# Patient Record
(demographics unavailable — no encounter records)

---

## 2024-10-10 NOTE — PHYSICAL EXAM
[de-identified] : Physical exam of her left thumb: Negative swelling or ecchymosis.  Nontender over the MCP or IP joint.  No laxity to collateral ligaments.  She can flex and extend at the MCP and IP joint.  Her sensory and motor are intact.

## 2024-10-10 NOTE — DATA REVIEWED
[FreeTextEntry1] : MRI of her left thumb obtained on 10/2024 show a focal full-thickness volar plate tear without any injury to the collateral ligaments.

## 2024-10-10 NOTE — HISTORY OF PRESENT ILLNESS
[de-identified] : Patient is a 14-year-old female here for evaluation of her left thumb.  She is about 2 and half weeks status post her injury.  She is feeling much better.

## 2024-10-10 NOTE — DISCUSSION/SUMMARY
[de-identified] : Patient is about 2 and half weeks status post the injury.  She understands his injuries take about 4 to 6 weeks to heal.  The first 2 to 3 weeks are typically the worst.  She will start to wean herself out of the thumb spica brace.  I recommend she refrain from contact sports for another 2 weeks.  She understands random residual pain and swelling, or for months.  Follow-up as needed.  All questions were answered today.